# Patient Record
Sex: MALE | Race: WHITE | NOT HISPANIC OR LATINO | Employment: OTHER | URBAN - METROPOLITAN AREA
[De-identification: names, ages, dates, MRNs, and addresses within clinical notes are randomized per-mention and may not be internally consistent; named-entity substitution may affect disease eponyms.]

---

## 2017-01-09 ENCOUNTER — OFFICE VISIT (OUTPATIENT)
Dept: OPHTHALMOLOGY | Facility: CLINIC | Age: 72
End: 2017-01-09
Payer: MEDICARE

## 2017-01-09 ENCOUNTER — TELEPHONE (OUTPATIENT)
Dept: OPHTHALMOLOGY | Facility: CLINIC | Age: 72
End: 2017-01-09

## 2017-01-09 DIAGNOSIS — H33.22 RETINAL DETACHMENT, LEFT: ICD-10-CM

## 2017-01-09 DIAGNOSIS — H35.3131 NONEXUDATIVE AGE-RELATED MACULAR DEGENERATION, BILATERAL, EARLY DRY STAGE: ICD-10-CM

## 2017-01-09 DIAGNOSIS — H43.811 POSTERIOR VITREOUS DETACHMENT OF RIGHT EYE: Primary | ICD-10-CM

## 2017-01-09 DIAGNOSIS — H35.372 EPIRETINAL MEMBRANE, LEFT EYE: ICD-10-CM

## 2017-01-09 PROCEDURE — 92226 PR SPECIAL EYE EXAM, SUBSEQUENT: CPT | Mod: 50,PBBFAC | Performed by: OPHTHALMOLOGY

## 2017-01-09 PROCEDURE — 99999 PR PBB SHADOW E&M-EST. PATIENT-LVL II: CPT | Mod: PBBFAC,,, | Performed by: OPHTHALMOLOGY

## 2017-01-09 PROCEDURE — 92014 COMPRE OPH EXAM EST PT 1/>: CPT | Mod: S$PBB,,, | Performed by: OPHTHALMOLOGY

## 2017-01-09 PROCEDURE — 99212 OFFICE O/P EST SF 10 MIN: CPT | Mod: PBBFAC,25 | Performed by: OPHTHALMOLOGY

## 2017-01-09 PROCEDURE — 92226 PR SPECIAL EYE EXAM, SUBSEQUENT: CPT | Mod: 50,S$PBB,, | Performed by: OPHTHALMOLOGY

## 2017-01-09 NOTE — TELEPHONE ENCOUNTER
----- Message from Yojana Perea sent at 1/9/2017  9:15 AM CST -----  Contact: Patient  Original message sent to Brianna, but no reply so copying it to staff's in basket.  -----  Italia Reed. I have Mr. Sanjeev Brink on the line. He thinks he has detached retina in right eye. Has spots and flashes, sounds like this may not be first time for him. Said this happened over the weekend and he needs to be seen today. He said this happened Saturday evening. Describes mist over eye. Please call him about this at [phone] (MRN is [id number])

## 2017-01-09 NOTE — PROGRESS NOTES
OCT  Drusen OU, ERM OS      A/P    1. PVD OD  - no tears/Breaks  - RD precautions      2.  H/X RRD OS S/P PPV/SB/EL 1987  Corneal laceration & cataract removal & 2 implant 1 yr later.  23yrs ago.   OS  - stable monitor      3 Dry early ARMD OU  - monitor    4. ERM OS  - monitor      5. PCIOL OU  Mild PCO OD      1 month Dilate OD

## 2017-01-09 NOTE — MR AVS SNAPSHOT
[facility] - Ophthalmology  [address]  [facility] [address]  Phone: [phone]  Fax: [phone]                  Sanjeev Brink   2017 10:30 AM   Office Visit    Description:  Male : 1945   Provider:  BONG Moreau MD   Department:  [facility]           Reason for Visit     Spots and/or Floaters           Diagnoses this Visit        Comments    Posterior vitreous detachment of right eye    -  Primary     Nonexudative age-related macular degeneration, bilateral, early dry stage         Retinal detachment, left         Epiretinal membrane, left eye                To Do List           Goals (5 Years of Data)     None      Follow-Up and Disposition     Return in about 4 weeks (around 2017).      [facility] On Call     [facility] On Call Nurse Care Line -  Assistance  Registered nurses in the [facility] On Call Center provide clinical advisement, health education, appointment booking, and other advisory services.  Call for this free service at [phone].             Medications           Message regarding Medications     Verify the changes and/or additions to your medication regime listed below are the same as discussed with your clinician today.  If any of these changes or additions are incorrect, please notify your healthcare provider.             Verify that the below list of medications is an accurate representation of the medications you are currently taking.  If none reported, the list may be blank. If incorrect, please contact your healthcare provider. Carry this list with you in case of emergency.           Current Medications     allopurinol (ZYLOPRIM) 300 MG tablet Take 1 tablet (300 mg total) by mouth once daily.    amlodipine-benazepril 10-20mg (LOTREL) 10-20 mg per capsule Take 1 capsule by mouth once daily.    aspirin 81 MG Chew Take 81 mg by mouth once daily.    atorvastatin (LIPITOR) 20 MG tablet Take 1 tablet (20 mg total) by mouth once daily. For  chlesterol    cholecalciferol, vitamin D3, (VITAMIN D3) 2,000 unit Tab Take 1 tablet by mouth once daily.    cinnamon bark 500 mg capsule Take 2,000 mg by mouth once daily.    levoFLOXacin (LEVAQUIN) 500 MG tablet     meloxicam (MOBIC) 7.5 MG tablet     potassium citrate (UROCIT-K) 10 mEq (1,080 mg) TbSR TAKE 1 TABLET FOUR TIMES A DAY    vitamin E 200 UNIT capsule Take 200 Units by mouth once daily.           Clinical Reference Information           Allergies as of 1/9/2017     No Known Allergies      Immunizations Administered on Date of Encounter - 1/9/2017     None

## 2017-02-14 ENCOUNTER — OFFICE VISIT (OUTPATIENT)
Dept: OPHTHALMOLOGY | Facility: CLINIC | Age: 72
End: 2017-02-14
Payer: MEDICARE

## 2017-02-14 DIAGNOSIS — H35.372 EPIRETINAL MEMBRANE, LEFT EYE: ICD-10-CM

## 2017-02-14 DIAGNOSIS — H43.811 POSTERIOR VITREOUS DETACHMENT OF RIGHT EYE: ICD-10-CM

## 2017-02-14 DIAGNOSIS — H35.3131 NONEXUDATIVE AGE-RELATED MACULAR DEGENERATION, BILATERAL, EARLY DRY STAGE: Primary | ICD-10-CM

## 2017-02-14 PROCEDURE — 92226 PR SPECIAL EYE EXAM, SUBSEQUENT: CPT | Mod: PBBFAC,RT | Performed by: OPHTHALMOLOGY

## 2017-02-14 PROCEDURE — 92014 COMPRE OPH EXAM EST PT 1/>: CPT | Mod: S$PBB,,, | Performed by: OPHTHALMOLOGY

## 2017-02-14 PROCEDURE — 99212 OFFICE O/P EST SF 10 MIN: CPT | Mod: PBBFAC | Performed by: OPHTHALMOLOGY

## 2017-02-14 PROCEDURE — 92226 PR SPECIAL EYE EXAM, SUBSEQUENT: CPT | Mod: S$PBB,RT,, | Performed by: OPHTHALMOLOGY

## 2017-02-14 PROCEDURE — 99999 PR PBB SHADOW E&M-EST. PATIENT-LVL II: CPT | Mod: PBBFAC,,, | Performed by: OPHTHALMOLOGY

## 2017-02-14 NOTE — MR AVS SNAPSHOT
[facility] - Ophthalmology  [address]  [facility] [address]  Phone: [phone]  Fax: [phone]                  Sanjeev Brink   2017 9:20 AM   Office Visit    Description:  Male : 1945   Provider:  BONG Moreau MD   Department:  [facility] - Ophthalmology           Reason for Visit     1 mon ck           Diagnoses this Visit        Comments    Nonexudative age-related macular degeneration, bilateral, early dry stage    -  Primary     Posterior vitreous detachment of right eye         Epiretinal membrane, left eye                To Do List           Goals (5 Years of Data)     None      Follow-Up and Disposition     Return in about 3 months (around 2017).      [facility] On Call     [facility] On Call Nurse Care Line -  Assistance  Registered nurses in the [facility] On Call Center provide clinical advisement, health education, appointment booking, and other advisory services.  Call for this free service at [phone].             Medications           Message regarding Medications     Verify the changes and/or additions to your medication regime listed below are the same as discussed with your clinician today.  If any of these changes or additions are incorrect, please notify your healthcare provider.             Verify that the below list of medications is an accurate representation of the medications you are currently taking.  If none reported, the list may be blank. If incorrect, please contact your healthcare provider. Carry this list with you in case of emergency.           Current Medications     allopurinol (ZYLOPRIM) 300 MG tablet Take 1 tablet (300 mg total) by mouth once daily.    amlodipine-benazepril 10-20mg (LOTREL) 10-20 mg per capsule Take 1 capsule by mouth once daily.    aspirin 81 MG Chew Take 81 mg by mouth once daily.    atorvastatin (LIPITOR) 20 MG tablet Take 1 tablet (20 mg total) by mouth once daily. For chlesterol    cholecalciferol, vitamin D3,  (VITAMIN D3) 2,000 unit Tab Take 1 tablet by mouth once daily.    cinnamon bark 500 mg capsule Take 2,000 mg by mouth once daily.    levoFLOXacin (LEVAQUIN) 500 MG tablet     meloxicam (MOBIC) 7.5 MG tablet     potassium citrate (UROCIT-K) 10 mEq (1,080 mg) TbSR Take 1 tablet (10 mEq total) by mouth 4 (four) times daily.    vitamin E 200 UNIT capsule Take 200 Units by mouth once daily.           Clinical Reference Information           Allergies as of 2/14/2017     No Known Allergies      Immunizations Administered on Date of Encounter - 2/14/2017     None      Orders Placed During Today's Visit     Future Labs/Procedures Expected by Expires    Posterior Segment OCT Retina-Both eyes  As directed 2/14/2018      Language Assistance Services     ATTENTION: Language assistance services are available, free of charge. Please call [phone].      ATENCIÓN: Si shilpa thorpe, tiene a coto disposición servicios gratuitos de asistencia lingüística. Llame al [phone].     CHÚ Ý: N?u b?n nói Ti?ng Vi?t, có các d?ch v? h? tr? ngôn ng? mi?n phí dành cho b?n. G?i s? [phone].         [facility] complies with applicable Federal civil rights laws and does not discriminate on the basis of race, color, national origin, age, disability, or sex.

## 2017-02-14 NOTE — PROGRESS NOTES
OCT  Drusen OU, ERM OS      A/P    1. PVD OD  - no tears/Breaks  - RD precautions    Central Vit opacities OD - No PPV now, if NI, can consider      2.  H/X RRD OS S/P PPV/SB/EL 1987  Corneal laceration & cataract removal & 2 implant 1 yr later.  23yrs ago.   OS  - stable monitor      3 Dry early ARMD OU  - monitor    4. ERM OS  - monitor      5. PCIOL OU  PCO OD - see Pulin for possible Yag      3 months OCT

## 2017-03-24 ENCOUNTER — OFFICE VISIT (OUTPATIENT)
Dept: OPHTHALMOLOGY | Facility: CLINIC | Age: 72
End: 2017-03-24
Payer: MEDICARE

## 2017-03-24 DIAGNOSIS — H35.3131 NONEXUDATIVE AGE-RELATED MACULAR DEGENERATION, BILATERAL, EARLY DRY STAGE: Primary | ICD-10-CM

## 2017-03-24 DIAGNOSIS — H26.493 POSTERIOR CAPSULAR OPACIFICATION, BILATERAL: ICD-10-CM

## 2017-03-24 PROCEDURE — 99212 OFFICE O/P EST SF 10 MIN: CPT | Mod: PBBFAC | Performed by: OPHTHALMOLOGY

## 2017-03-24 PROCEDURE — 66821 AFTER CATARACT LASER SURGERY: CPT | Mod: S$PBB,RT,, | Performed by: OPHTHALMOLOGY

## 2017-03-24 PROCEDURE — 99999 PR PBB SHADOW E&M-EST. PATIENT-LVL II: CPT | Mod: PBBFAC,,, | Performed by: OPHTHALMOLOGY

## 2017-03-24 PROCEDURE — 92012 INTRM OPH EXAM EST PATIENT: CPT | Mod: 57,S$PBB,, | Performed by: OPHTHALMOLOGY

## 2017-03-24 PROCEDURE — 66821 AFTER CATARACT LASER SURGERY: CPT | Mod: PBBFAC,RT | Performed by: OPHTHALMOLOGY

## 2017-03-24 NOTE — MR AVS SNAPSHOT
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 macular degeneration, bilateral, early dry stage    -  Primary     Posterior capsular opacification, bilateral                To Do List           Future Appointments        Provider Department Dept Phone    2017 8:30 AM BONG Moreau MD [facility] Ophthalmology [phone]      Goals (5 Years of Data)     None      [facility] On Call     [facility] On Call Nurse Care Line -  Assistance  Registered nurses in the [facility] On Call Center provide clinical advisement, health education, appointment booking, and other advisory services.  Call for this free service at [phone].             Medications           Message regarding Medications     Verify the changes and/or additions to your medication regime listed below are the same as discussed with your clinician today.  If any of these changes or additions are incorrect, please notify your healthcare provider.             Verify that the below list of medications is an accurate representation of the medications you are currently taking.  If none reported, the list may be blank. If incorrect, please contact your healthcare provider. Carry this list with you in case of emergency.           Current Medications     allopurinol (ZYLOPRIM) 300 MG tablet Take 1 tablet (300 mg total) by mouth once daily.    amlodipine-benazepril 10-20mg (LOTREL) 10-20 mg per capsule Take 1 capsule by mouth once daily.    aspirin 81 MG Chew Take 81 mg by mouth once daily.    atorvastatin (LIPITOR) 20 MG tablet Take 1 tablet (20 mg total) by mouth once daily. For chlesterol     cholecalciferol, vitamin D3, (VITAMIN D3) 2,000 unit Tab Take 1 tablet by mouth once daily.    cinnamon bark 500 mg capsule Take 2,000 mg by mouth once daily.    levoFLOXacin (LEVAQUIN) 500 MG tablet     meloxicam (MOBIC) 7.5 MG tablet     potassium citrate (UROCIT-K) 10 mEq (1,080 mg) TbSR Take 1 tablet (10 mEq total) by mouth 4 (four) times daily.    vitamin E 200 UNIT capsule Take 200 Units by mouth once daily.           Clinical Reference Information           Allergies as of 3/24/2017     No Known Allergies      Immunizations Administered on Date of Encounter - 3/24/2017     None      Language Assistance Services     ATTENTION: Language assistance services are available, free of charge. Please call [phone].      ATENCIÓN: Si estivenla ila, tiene a coto disposición servicios gratuitos de asistencia lingüística. Llame al [phone].     KAT Ý: N?u b?n nói Ti?ng Vi?t, có các d?ch v? h? tr? ngôn ng? mi?n phí dành cho b?n. G?i s? [phone].         [facility] complies with applicable Federal civil rights laws and does not discriminate on the basis of race, color, national origin, age, disability, or sex.

## 2017-05-19 ENCOUNTER — OFFICE VISIT (OUTPATIENT)
Dept: OPHTHALMOLOGY | Facility: CLINIC | Age: 72
End: 2017-05-19
Payer: MEDICARE

## 2017-05-19 DIAGNOSIS — H33.22 RETINAL DETACHMENT, LEFT: ICD-10-CM

## 2017-05-19 DIAGNOSIS — H35.3131 NONEXUDATIVE AGE-RELATED MACULAR DEGENERATION, BILATERAL, EARLY DRY STAGE: ICD-10-CM

## 2017-05-19 DIAGNOSIS — H43.811 POSTERIOR VITREOUS DETACHMENT OF RIGHT EYE: ICD-10-CM

## 2017-05-19 DIAGNOSIS — H35.372 EPIRETINAL MEMBRANE, LEFT EYE: Primary | ICD-10-CM

## 2017-05-19 PROCEDURE — 92134 CPTRZ OPH DX IMG PST SGM RTA: CPT | Mod: PBBFAC | Performed by: OPHTHALMOLOGY

## 2017-05-19 PROCEDURE — 99213 OFFICE O/P EST LOW 20 MIN: CPT | Mod: PBBFAC | Performed by: OPHTHALMOLOGY

## 2017-05-19 PROCEDURE — 99999 PR PBB SHADOW E&M-EST. PATIENT-LVL III: CPT | Mod: PBBFAC,,, | Performed by: OPHTHALMOLOGY

## 2017-05-19 PROCEDURE — 92014 COMPRE OPH EXAM EST PT 1/>: CPT | Mod: 24,S$PBB,, | Performed by: OPHTHALMOLOGY

## 2017-05-19 PROCEDURE — 92226 PR SPECIAL EYE EXAM, SUBSEQUENT: CPT | Mod: 50,S$PBB,, | Performed by: OPHTHALMOLOGY

## 2017-05-19 PROCEDURE — 92226 PR SPECIAL EYE EXAM, SUBSEQUENT: CPT | Mod: 50,PBBFAC | Performed by: OPHTHALMOLOGY

## 2017-05-19 NOTE — PROGRESS NOTES
OCT  Drusen OU, ERM OS      A/P    1. PVD OD  - no tears/Breaks  - RD precautions    Central Vit opacities OD - No PPV now, if NI, can consider      2.  H/X RRD OS S/P PPV/SB/EL 1987  Corneal laceration & cataract removal & 2 implant 1 yr later.  23yrs ago.   OS  - stable monitor      3 Dry early ARMD OU  - monitor    4. ERM OS  - monitor      5. PCIOL OU  PCO OD - see Pulin for possible Yag      12 months OCT

## 2017-07-05 ENCOUNTER — LAB VISIT (OUTPATIENT)
Dept: LAB | Facility: OTHER | Age: 72
End: 2017-07-05
Attending: INTERNAL MEDICINE
Payer: MEDICARE

## 2017-07-05 DIAGNOSIS — R89.9 ABNORMAL LABORATORY TEST RESULT: ICD-10-CM

## 2017-07-05 DIAGNOSIS — R79.9 ABNORMAL FINDING OF BLOOD CHEMISTRY: ICD-10-CM

## 2017-07-05 LAB
ESTIMATED AVG GLUCOSE: 143 MG/DL
HBA1C MFR BLD HPLC: 6.6 %

## 2017-07-05 PROCEDURE — 36415 COLL VENOUS BLD VENIPUNCTURE: CPT

## 2017-07-05 PROCEDURE — 83036 HEMOGLOBIN GLYCOSYLATED A1C: CPT

## 2018-05-22 ENCOUNTER — OFFICE VISIT (OUTPATIENT)
Dept: OPHTHALMOLOGY | Facility: CLINIC | Age: 73
End: 2018-05-22
Payer: MEDICARE

## 2018-05-22 DIAGNOSIS — H33.22 RETINAL DETACHMENT, LEFT: Primary | ICD-10-CM

## 2018-05-22 DIAGNOSIS — H43.811 POSTERIOR VITREOUS DETACHMENT OF RIGHT EYE: ICD-10-CM

## 2018-05-22 DIAGNOSIS — H35.372 EPIRETINAL MEMBRANE, LEFT EYE: ICD-10-CM

## 2018-05-22 DIAGNOSIS — H35.3131 NONEXUDATIVE AGE-RELATED MACULAR DEGENERATION, BILATERAL, EARLY DRY STAGE: ICD-10-CM

## 2018-05-22 PROCEDURE — 99999 PR PBB SHADOW E&M-EST. PATIENT-LVL III: CPT | Mod: PBBFAC,,, | Performed by: OPHTHALMOLOGY

## 2018-05-22 PROCEDURE — 92134 CPTRZ OPH DX IMG PST SGM RTA: CPT | Mod: PBBFAC | Performed by: OPHTHALMOLOGY

## 2018-05-22 PROCEDURE — 92226 PR SPECIAL EYE EXAM, SUBSEQUENT: CPT | Mod: S$PBB,50,, | Performed by: OPHTHALMOLOGY

## 2018-05-22 PROCEDURE — 99213 OFFICE O/P EST LOW 20 MIN: CPT | Mod: PBBFAC | Performed by: OPHTHALMOLOGY

## 2018-05-22 PROCEDURE — 92226 PR SPECIAL EYE EXAM, SUBSEQUENT: CPT | Mod: 50,PBBFAC | Performed by: OPHTHALMOLOGY

## 2018-05-22 PROCEDURE — 92014 COMPRE OPH EXAM EST PT 1/>: CPT | Mod: S$PBB,,, | Performed by: OPHTHALMOLOGY

## 2018-05-22 RX ORDER — LANOLIN ALCOHOL/MO/W.PET/CERES
400 CREAM (GRAM) TOPICAL DAILY
COMMUNITY

## 2018-05-22 NOTE — PROGRESS NOTES
HPI     Yearly / OCT   DLS- 05/19/2017    Slight change in va but mainly due to needing new glasses rx.   Denies pain   (-)Flashes (-)Floaters  (+)Photophobia  (-)Glare      OCT  Drusen OU, ERM OS      A/P    1. PVD OD  - no tears/Breaks  - RD precautions    Central Vit opacities OD - No PPV now, if NI, can consider      2.  H/X RRD OS S/P PPV/SB/EL 1987  Corneal laceration & cataract removal & 2 implant 1 yr later.    OS  - stable monitor      3 Dry early ARMD OU  - monitor    4. ERM OS  - monitor      5. PCIOL OU  PCO OD - see Pulin for possible Yag      12 months OCT

## 2018-06-19 ENCOUNTER — OFFICE VISIT (OUTPATIENT)
Dept: OPTOMETRY | Facility: CLINIC | Age: 73
End: 2018-06-19
Payer: MEDICARE

## 2018-06-19 DIAGNOSIS — H52.13 MYOPIA WITH ASTIGMATISM AND PRESBYOPIA, BILATERAL: Primary | ICD-10-CM

## 2018-06-19 DIAGNOSIS — H52.203 MYOPIA WITH ASTIGMATISM AND PRESBYOPIA, BILATERAL: Primary | ICD-10-CM

## 2018-06-19 DIAGNOSIS — H52.4 MYOPIA WITH ASTIGMATISM AND PRESBYOPIA, BILATERAL: Primary | ICD-10-CM

## 2018-06-19 PROCEDURE — 99999 PR PBB SHADOW E&M-EST. PATIENT-LVL II: CPT | Mod: PBBFAC,,, | Performed by: OPTOMETRIST

## 2018-06-19 PROCEDURE — 99212 OFFICE O/P EST SF 10 MIN: CPT | Mod: PBBFAC | Performed by: OPTOMETRIST

## 2018-06-19 PROCEDURE — 92015 DETERMINE REFRACTIVE STATE: CPT | Mod: ,,, | Performed by: OPTOMETRIST

## 2018-06-19 NOTE — PROGRESS NOTES
HPI     Last eye exam was 5/22/18 with Dr. Moreau.  Patient states unhappy with most recent pair of glasses he got after last   exam-doesn't feel the vision changed but can't keep glasses cleaned.   Occasionally sees floater OU. Recently dilated by Dr. Moreau.   Patient denies diplopia, headaches, flashes, and pain.      Last edited by Danisha Segovia on 6/19/2018  8:53 AM. (History)            Assessment /Plan     For exam results, see Encounter Report.    Myopia with astigmatism and presbyopia, bilateral            1.  Bifocal rx given.  Continue follow-up with Dr. Moreau.

## 2018-07-11 ENCOUNTER — HOSPITAL ENCOUNTER (OUTPATIENT)
Dept: RADIOLOGY | Facility: OTHER | Age: 73
Discharge: HOME | End: 2018-07-11
Attending: INTERNAL MEDICINE
Payer: MEDICARE

## 2018-07-11 DIAGNOSIS — R10.11 RUQ PAIN: ICD-10-CM

## 2018-07-12 ENCOUNTER — HOSPITAL ENCOUNTER (OUTPATIENT)
Dept: RADIOLOGY | Facility: OTHER | Age: 73
Discharge: HOME | End: 2018-07-12
Attending: INTERNAL MEDICINE
Payer: MEDICARE

## 2018-07-12 PROCEDURE — 76700 US EXAM ABDOM COMPLETE: CPT | Mod: 26,,, | Performed by: RADIOLOGY

## 2018-07-12 PROCEDURE — 76700 US EXAM ABDOM COMPLETE: CPT | Mod: TC

## 2018-08-08 ENCOUNTER — HOSPITAL ENCOUNTER (OUTPATIENT)
Dept: RADIOLOGY | Facility: OTHER | Age: 73
Discharge: HOME | End: 2018-08-08
Attending: INTERNAL MEDICINE
Payer: MEDICARE

## 2018-08-08 DIAGNOSIS — R10.11 RUQ PAIN: ICD-10-CM

## 2018-08-08 PROCEDURE — 76700 US EXAM ABDOM COMPLETE: CPT | Mod: TC

## 2018-08-08 PROCEDURE — 76700 US EXAM ABDOM COMPLETE: CPT | Mod: 26,,, | Performed by: RADIOLOGY

## 2018-08-27 DIAGNOSIS — I77.810 ASCENDING AORTA DILATATION: Primary | ICD-10-CM

## 2018-09-28 DIAGNOSIS — I71.20 ANEURYSM, AORTA, THORACIC: Primary | ICD-10-CM

## 2019-03-15 DIAGNOSIS — I71.9 AORTIC ANEURYSM: Primary | ICD-10-CM

## 2019-05-28 ENCOUNTER — LAB VISIT (OUTPATIENT)
Dept: LAB | Facility: OTHER | Age: 74
End: 2019-05-28
Attending: INTERNAL MEDICINE
Payer: MEDICARE

## 2019-05-28 DIAGNOSIS — Z12.5 SCREENING FOR PROSTATE CANCER: ICD-10-CM

## 2019-05-28 LAB — COMPLEXED PSA SERPL-MCNC: 0.71 NG/ML (ref 0–4)

## 2019-05-28 PROCEDURE — 36415 COLL VENOUS BLD VENIPUNCTURE: CPT

## 2019-05-28 PROCEDURE — 84153 ASSAY OF PSA TOTAL: CPT

## 2019-08-06 DIAGNOSIS — I71.20 ANEURYSM, AORTA, THORACIC: Primary | ICD-10-CM

## 2019-09-12 ENCOUNTER — LAB VISIT (OUTPATIENT)
Dept: LAB | Facility: OTHER | Age: 74
End: 2019-09-12
Attending: INTERNAL MEDICINE
Payer: MEDICARE

## 2019-09-12 DIAGNOSIS — R73.9 ELEVATED BLOOD SUGAR: ICD-10-CM

## 2019-09-12 LAB
ESTIMATED AVG GLUCOSE: 140 MG/DL (ref 68–131)
HBA1C MFR BLD HPLC: 6.5 % (ref 4–5.6)

## 2019-09-12 PROCEDURE — 36415 COLL VENOUS BLD VENIPUNCTURE: CPT

## 2019-09-12 PROCEDURE — 83036 HEMOGLOBIN GLYCOSYLATED A1C: CPT

## 2020-02-10 ENCOUNTER — OFFICE VISIT (OUTPATIENT)
Dept: OPTOMETRY | Facility: CLINIC | Age: 75
End: 2020-02-10
Payer: MEDICARE

## 2020-02-10 DIAGNOSIS — H52.4 MYOPIA WITH ASTIGMATISM AND PRESBYOPIA, BILATERAL: Primary | ICD-10-CM

## 2020-02-10 DIAGNOSIS — H52.13 MYOPIA WITH ASTIGMATISM AND PRESBYOPIA, BILATERAL: Primary | ICD-10-CM

## 2020-02-10 DIAGNOSIS — H52.203 MYOPIA WITH ASTIGMATISM AND PRESBYOPIA, BILATERAL: Primary | ICD-10-CM

## 2020-02-10 PROCEDURE — 99999 PR PBB SHADOW E&M-EST. PATIENT-LVL II: ICD-10-PCS | Mod: PBBFAC,,, | Performed by: OPTOMETRIST

## 2020-02-10 PROCEDURE — 99212 OFFICE O/P EST SF 10 MIN: CPT | Mod: PBBFAC | Performed by: OPTOMETRIST

## 2020-02-10 PROCEDURE — 92015 PR REFRACTION: ICD-10-PCS | Mod: ,,, | Performed by: OPTOMETRIST

## 2020-02-10 PROCEDURE — 92015 DETERMINE REFRACTIVE STATE: CPT | Mod: ,,, | Performed by: OPTOMETRIST

## 2020-02-10 PROCEDURE — 99999 PR PBB SHADOW E&M-EST. PATIENT-LVL II: CPT | Mod: PBBFAC,,, | Performed by: OPTOMETRIST

## 2020-02-10 NOTE — PROGRESS NOTES
HPI     Last eye exam was 6/19/18 with Dr. Champagne.  Patient states didn't update glasses rx after last exam. Has noticed   vision is slightly worse since last visit. Patient scheduled to see Dr. Moreau tomorrow for dilated exam.   Patient denies diplopia, headaches, flashes/floaters, and pain.    Hemoglobin A1C       Date                     Value               Ref Range             Status                09/12/2019               6.5 (H)             4.0 - 5.6 %           Final                  Last edited by Danisha Segovia on 2/10/2020  8:02 AM. (History)            Assessment /Plan     For exam results, see Encounter Report.    Myopia with astigmatism and presbyopia, bilateral            1.  Bifocal rx given.  RTC as scheduled with Dr. Moreau.

## 2020-02-11 ENCOUNTER — OFFICE VISIT (OUTPATIENT)
Dept: OPHTHALMOLOGY | Facility: CLINIC | Age: 75
End: 2020-02-11
Payer: MEDICARE

## 2020-02-11 DIAGNOSIS — H43.811 POSTERIOR VITREOUS DETACHMENT OF RIGHT EYE: ICD-10-CM

## 2020-02-11 DIAGNOSIS — H35.723 RETINAL PIGMENT EPITHELIAL DETACHMENT OF BOTH EYES: ICD-10-CM

## 2020-02-11 DIAGNOSIS — H35.372 EPIRETINAL MEMBRANE, LEFT EYE: ICD-10-CM

## 2020-02-11 DIAGNOSIS — H35.3131 NONEXUDATIVE AGE-RELATED MACULAR DEGENERATION, BILATERAL, EARLY DRY STAGE: Primary | ICD-10-CM

## 2020-02-11 PROCEDURE — 92134 CPTRZ OPH DX IMG PST SGM RTA: CPT | Mod: PBBFAC | Performed by: OPHTHALMOLOGY

## 2020-02-11 PROCEDURE — 99999 PR PBB SHADOW E&M-EST. PATIENT-LVL III: CPT | Mod: PBBFAC,,, | Performed by: OPHTHALMOLOGY

## 2020-02-11 PROCEDURE — 92201 OPSCPY EXTND RTA DRAW UNI/BI: CPT | Mod: S$PBB,,, | Performed by: OPHTHALMOLOGY

## 2020-02-11 PROCEDURE — 92201 PR OPHTHALMOSCOPY, EXT, W/RET DRAW/SCLERAL DEPR, I&R, UNI/BI: ICD-10-PCS | Mod: S$PBB,,, | Performed by: OPHTHALMOLOGY

## 2020-02-11 PROCEDURE — 92014 PR EYE EXAM, EST PATIENT,COMPREHESV: ICD-10-PCS | Mod: S$PBB,,, | Performed by: OPHTHALMOLOGY

## 2020-02-11 PROCEDURE — 99999 PR PBB SHADOW E&M-EST. PATIENT-LVL III: ICD-10-PCS | Mod: PBBFAC,,, | Performed by: OPHTHALMOLOGY

## 2020-02-11 PROCEDURE — 92134 POSTERIOR SEGMENT OCT RETINA (OCULAR COHERENCE TOMOGRAPHY)-BOTH EYES: ICD-10-PCS | Mod: 26,S$PBB,, | Performed by: OPHTHALMOLOGY

## 2020-02-11 PROCEDURE — 92014 COMPRE OPH EXAM EST PT 1/>: CPT | Mod: S$PBB,,, | Performed by: OPHTHALMOLOGY

## 2020-02-11 PROCEDURE — 99213 OFFICE O/P EST LOW 20 MIN: CPT | Mod: PBBFAC,25 | Performed by: OPHTHALMOLOGY

## 2020-02-11 NOTE — PROGRESS NOTES
HPI     Overdue Yearly / OCT   DLS- 05/22/2018 Dr. Moreau    Pt sts just updated new gls rx and was minor change.  No problems or concerns     Denies pain   (-)Flashes (-)Floaters  (+)Photophobia  (-)Glare      No gtts         OCT  Drusen OU, ERM OS      A/P    1. PVD OD  - no tears/Breaks  - RD precautions    Central Vit opacities OD - No PPV now, if NI, can consider      2.  H/X RRD OS S/P PPV/SB/EL 1987  Corneal laceration & cataract removal & 2 implant 1 yr later.    OS  - stable monitor      3 Dry early ARMD OU  - monitor    4. ERM OS  - monitor  20/30 yesterday with Dr. palencia      5. PCIOL OU        2 year OCT

## 2020-02-12 PROBLEM — R07.9 CHEST PAIN: Status: ACTIVE | Noted: 2020-02-12

## 2020-02-27 PROBLEM — I71.21 ASCENDING AORTIC ANEURYSM: Status: ACTIVE | Noted: 2020-02-27

## 2020-02-27 PROBLEM — I25.10 CORONARY ARTERY DISEASE INVOLVING NATIVE CORONARY ARTERY: Status: ACTIVE | Noted: 2020-02-27

## 2020-05-26 ENCOUNTER — LAB VISIT (OUTPATIENT)
Dept: LAB | Facility: OTHER | Age: 75
End: 2020-05-26
Attending: INTERNAL MEDICINE
Payer: MEDICARE

## 2020-05-26 DIAGNOSIS — E11.9 DIABETES MELLITUS WITHOUT COMPLICATION: ICD-10-CM

## 2020-05-26 LAB
ESTIMATED AVG GLUCOSE: 157 MG/DL (ref 68–131)
HBA1C MFR BLD HPLC: 7.1 % (ref 4–5.6)

## 2020-05-26 PROCEDURE — 83036 HEMOGLOBIN GLYCOSYLATED A1C: CPT

## 2020-05-26 PROCEDURE — 36415 COLL VENOUS BLD VENIPUNCTURE: CPT

## 2020-07-24 ENCOUNTER — LAB VISIT (OUTPATIENT)
Dept: INTERNAL MEDICINE | Facility: CLINIC | Age: 75
End: 2020-07-24
Payer: MEDICARE

## 2020-07-24 DIAGNOSIS — Z20.828 EXPOSURE TO SARS-ASSOCIATED CORONAVIRUS: ICD-10-CM

## 2020-07-24 PROCEDURE — U0003 INFECTIOUS AGENT DETECTION BY NUCLEIC ACID (DNA OR RNA); SEVERE ACUTE RESPIRATORY SYNDROME CORONAVIRUS 2 (SARS-COV-2) (CORONAVIRUS DISEASE [COVID-19]), AMPLIFIED PROBE TECHNIQUE, MAKING USE OF HIGH THROUGHPUT TECHNOLOGIES AS DESCRIBED BY CMS-2020-01-R: HCPCS

## 2020-07-25 LAB — SARS-COV-2 RNA RESP QL NAA+PROBE: NOT DETECTED

## 2020-09-16 ENCOUNTER — OFFICE VISIT (OUTPATIENT)
Dept: FAMILY MEDICINE | Facility: CLINIC | Age: 75
End: 2020-09-16
Payer: MEDICARE

## 2020-09-16 VITALS
HEART RATE: 60 BPM | HEIGHT: 69 IN | SYSTOLIC BLOOD PRESSURE: 134 MMHG | TEMPERATURE: 98 F | WEIGHT: 177.88 LBS | BODY MASS INDEX: 26.34 KG/M2 | OXYGEN SATURATION: 99 % | DIASTOLIC BLOOD PRESSURE: 70 MMHG | RESPIRATION RATE: 18 BRPM

## 2020-09-16 DIAGNOSIS — H35.3131 NONEXUDATIVE AGE-RELATED MACULAR DEGENERATION, BILATERAL, EARLY DRY STAGE: ICD-10-CM

## 2020-09-16 DIAGNOSIS — I70.0 AORTIC CALCIFICATION: ICD-10-CM

## 2020-09-16 DIAGNOSIS — Z00.00 ENCOUNTER FOR PREVENTIVE HEALTH EXAMINATION: Primary | ICD-10-CM

## 2020-09-16 DIAGNOSIS — I10 ESSENTIAL HYPERTENSION: ICD-10-CM

## 2020-09-16 DIAGNOSIS — I70.213 ATHEROSCLEROSIS OF NATIVE ARTERY OF BOTH LOWER EXTREMITIES WITH INTERMITTENT CLAUDICATION: ICD-10-CM

## 2020-09-16 DIAGNOSIS — I71.21 ASCENDING AORTIC ANEURYSM: ICD-10-CM

## 2020-09-16 DIAGNOSIS — E66.3 OVERWEIGHT (BMI 25.0-29.9): ICD-10-CM

## 2020-09-16 DIAGNOSIS — M10.9 GOUT, ARTHRITIS: ICD-10-CM

## 2020-09-16 DIAGNOSIS — E11.65 TYPE 2 DIABETES MELLITUS WITH HYPERGLYCEMIA, WITHOUT LONG-TERM CURRENT USE OF INSULIN: ICD-10-CM

## 2020-09-16 DIAGNOSIS — I25.10 CORONARY ARTERY DISEASE INVOLVING NATIVE CORONARY ARTERY OF NATIVE HEART WITHOUT ANGINA PECTORIS: ICD-10-CM

## 2020-09-16 PROCEDURE — G0439 PR MEDICARE ANNUAL WELLNESS SUBSEQUENT VISIT: ICD-10-PCS | Mod: S$GLB,,, | Performed by: NURSE PRACTITIONER

## 2020-09-16 PROCEDURE — G0439 PPPS, SUBSEQ VISIT: HCPCS | Mod: S$GLB,,, | Performed by: NURSE PRACTITIONER

## 2020-09-16 PROCEDURE — 99999 PR PBB SHADOW E&M-EST. PATIENT-LVL IV: ICD-10-PCS | Mod: PBBFAC,,, | Performed by: NURSE PRACTITIONER

## 2020-09-16 PROCEDURE — 99999 PR PBB SHADOW E&M-EST. PATIENT-LVL IV: CPT | Mod: PBBFAC,,, | Performed by: NURSE PRACTITIONER

## 2020-09-16 PROCEDURE — 99214 OFFICE O/P EST MOD 30 MIN: CPT | Mod: PBBFAC,PN | Performed by: NURSE PRACTITIONER

## 2020-09-16 NOTE — PROGRESS NOTES
"  Sanjeev Brink presented for a  Medicare AWV and comprehensive Health Risk Assessment today. The following components were reviewed and updated:    · Medical history  · Family History  · Social history  · Allergies and Current Medications  · Health Risk Assessment  · Health Maintenance  · Care Team     ** See Completed Assessments for Annual Wellness Visit within the encounter summary.**         The following assessments were completed:  · Living Situation  · CAGE  · Depression Screening  · Timed Get Up and Go  · Whisper Test  · Cognitive Function Screening  · Nutrition Screening  · ADL Screening  · PAQ Screening            Vitals:    09/16/20 1136   BP: 134/70   Pulse: 60   Resp: 18   Temp: 97.8 °F (36.6 °C)   SpO2: 99%   Weight: 80.7 kg (177 lb 14.4 oz)   Height: 5' 9" (1.753 m)     Body mass index is 26.27 kg/m².  Physical Exam  Constitutional:       General: He is not in acute distress.     Appearance: Normal appearance. He is well-developed. He is not ill-appearing.   HENT:      Head: Normocephalic.      Right Ear: Tympanic membrane and ear canal normal.      Left Ear: Ear canal normal.      Nose: Nose normal.   Eyes:      Conjunctiva/sclera: Conjunctivae normal.      Pupils: Pupils are equal, round, and reactive to light.   Neck:      Musculoskeletal: Normal range of motion and neck supple.   Cardiovascular:      Rate and Rhythm: Normal rate and regular rhythm.   Pulmonary:      Effort: Pulmonary effort is normal.      Breath sounds: Normal breath sounds.   Abdominal:      General: Bowel sounds are normal.      Palpations: Abdomen is soft.      Tenderness: There is no abdominal tenderness.   Musculoskeletal: Normal range of motion.         General: No swelling.   Skin:     General: Skin is warm and dry.   Neurological:      Mental Status: He is alert and oriented to person, place, and time.   Psychiatric:         Mood and Affect: Mood normal.         Behavior: Behavior normal.             Diagnoses and health " risks identified today and associated recommendations/orders:    1. Encounter for preventive health examination    2. Essential hypertension  3. Coronary artery disease involving native coronary artery of native heart without angina pectoris  4. Atherosclerosis of native artery of both lower extremities with intermittent claudication  5. Aortic calcification  6. Ascending aortic aneurysm  - Chronic, stable, continue current medication therapy  - Followed by PCP and cardiology    7. Type 2 diabetes mellitus with hyperglycemia, without long-term current use of insulin  - Chronic, stable; controlled with diet and exercise  - Followed by PCP    8. Gout, arthritis  - Chronic, stable, continue current medication therapy  - Followed by PCP    9. Nonexudative age-related macular degeneration, bilateral, early dry stage  - Chronic, stable, continue current medication therapy  - Followed by opthalmology     10. Overweight (BMI 25.0-29.9)  - Recommend healthy diet and exercise      Provided Sanjeev with a 5-10 year written screening schedule and personal prevention plan. Recommendations were developed using the USPSTF age appropriate recommendations. Education, counseling, and referrals were provided as needed. After Visit Summary printed and given to patient which includes a list of additional screenings\tests needed.    Follow up for PCP visit as scheduled and Annual Medicare Wellness in 1 year.    SEBASTIAN Alexis  I offered to discuss end of life issues, including information on how to make advance directives that the patient could use to name someone who would make medical decisions on their behalf if they became too ill to make themselves.    _X_Patient declined  ___Patient is interested, I provided paper work and offered to discuss.

## 2020-09-16 NOTE — PATIENT INSTRUCTIONS
Healthy Meals for Diabetes     A healthcare provider will help you develop a meal plan that fits your needs.   Ask your healthcare team to help you make a meal plan that fits your needs. Your meal plan tells you when to eat your meals and snacks, what kinds of foods to eat, and how much of each food to eat. You dont have to give up all the foods you like. But you do need to follow some guidelines.  Choose healthy carbohydrates  Starches, sugars, and fiber are all types of carbohydrates. Fiber can help lower your cholesterol and triglycerides. Fiber is also healthy for your heart. You should have 20 to 35 grams of total fiber each day. Fiber-rich foods include:  · Whole-grain breads and cereals  · Bulgur wheat  · Brown rice     · Whole-wheat pasta  · Fruits and vegetables  · Dry beans, and peas   Keep track of the amount of carbohydrates you eat. This can help you keep the right balance of physical activity and medicine. The amount of carbohydrates needed will vary for each person. It depends on many things such as your health, the medicines you take, and how active you are. Your healthcare team will help you figure out the right amount of carbohydrates for you. You may start with around 45 to 60 grams of carbohydrates per meal, depending on your situation.   Here are some examples of foods containing about 15 grams of carbohydrates (1 serving of carbohydrates):  · 1/2 cup of canned or frozen fruit  · A small piece of fresh fruit (4 ounces)  · 1 slice of bread  · 1/2 cup of oatmeal  · 1/3 cup of rice  · 4 to 6 crackers  · 1/2 English muffin  · 1/2 cup of black beans  · 1/4 of a large baked potato (3 ounces)  · 2/3 cup of plain fat-free yogurt  · 1 cup of soup  · 1/2 cup of casserole  · 6 chicken nuggets  · 2-inch-square brownie or cake without frosting  · 2 small cookies  · 1/2 cup of ice cream or sherbet  Choose healthy protein foods  Eating protein that is low in fat can help you control your weight. It also  helps keep your heart healthy. Low-fat protein foods include:  · Fish  · Plant proteins, such as dry beans and peas, nuts, and soy products like tofu and soymilk  · Lean meat with all visible fat removed  · Poultry with the skin removed  · Low-fat or nonfat milk, cheese, and yogurt  Limit unhealthy fats and sugar  Saturated and trans fats are unhealthy for your heart. They raise LDL (bad) cholesterol. Fat is also high in calories, so it can make you gain weight. To cut down on unhealthy fats and sugar, limit these foods:  · Butter or margarine  · Palm and palm kernel oils and coconut oil  · Cream  · Cheese  · Griffith  · Lunch meats     · Ice cream  · Sweet bakery goods such as pies, muffins, and donuts  · Jams and jellies  · Candy bars  · Regular sodas   How much to eat  The amount of food you eat affects your blood sugar. It also affects your weight. Your healthcare team will tell you how much of each type of food you should eat.  · Use measuring cups and spoons and a food scale to measure serving sizes.  · Learn what a correct serving size looks like on your plate. This will help when you are away from home and cant measure your servings.  · Eat only the number of servings given on your meal plan for each food. Dont take seconds.  · Learn to read food labels. Be sure to look at serving size, total carbohydrates, fiber, calories, sugar, and saturated and trans fats. Look for healthier alternatives to foods that have added sugar.  · Plan ahead for parties so you can still have a good time without going overboard with unhealthy food choices. Set a good example yourself by bringing a healthy dish to pot lucks.   Choose healthy snacks  When it comes to snacks, we usually think about foods with added sugar and fats. But there are many other options for healthier snack choices. Here are a few snack ideas to choose from:  Snacks with less than 5 grams of carbohydrates  · 1 piece of string cheese  · 3 celery sticks plus 1  tablespoon of peanut butter  · 5 cherry tomatoes plus 1 tablespoon of ranch dressing  · 1 hard-boiled egg  · 1/4 cup of fresh blueberries  ·  5 baby carrots  · 1 cup of light popcorn  · 1/2 cup of sugar-free gelatin  · 15 almonds  Snacks with about 10 to 20 grams of carbohydrates  · 1/3 cup of hummus plus 1 cup of fresh cut nonstarchy vegetables (carrots, green peppers, broccoli, celery, or a combination)  · 1/2 cup of fresh or canned fruit plus 1/4 cup of cottage cheese  · 1/2 cup of tuna salad with 4 crackers  · 2 rice cakes and a tablespoon of peanut butter  · 1 small apple or orange  · 3 cups light popcorn  · 1/2 of a turkey sandwich (1 slice of whole-wheat bread, 2 ounces of turkey, and mustard)  Portion sizes are important to controlling your blood sugar and staying at a healthy weight. Stock up on healthy snack items so you always have them on hand.  When to eat  Your meal plan will likely include breakfast, lunch, dinner, and some snacks.  · Try to eat your meals and snacks at about the same times each day.  · Eat all your meals and snacks. Skipping a meal or snack can make your blood sugar drop too low. It can also cause you to eat too much at the next meal or snack. Then your blood sugar could get too high.  Date Last Reviewed: 7/1/2016  © 5939-0841 [employer]. [address] [place] [address], [place] [address]. All rights reserved. This information is not intended as a substitute for professional medical care. Always follow your healthcare professional's instructions.        Counseling and Referral of Other Preventative  (Italic type indicates deductible and co-insurance are waived)    Patient Name: Sanjeev Brink  Today's Date: 9/16/2020    Health Maintenance       Date Due Completion Date    Hepatitis C Screening 1945 ---    Foot Exam 09/22/1955 ---    TETANUS VACCINE 09/22/1963 ---    Shingles Vaccine (2 of 3) 12/10/2013 10/15/2013    Colorectal Cancer Screening 05/30/2018 5/30/2013    Lipid  Panel 03/14/2020 3/14/2019    Pneumococcal Vaccine (65+ Low/Medium Risk) (2 of 2 - PPSV23) 06/11/2020 6/11/2019    Influenza Vaccine (1) 08/01/2020 ---    Hemoglobin A1c 11/26/2020 5/26/2020    Eye Exam 02/11/2021 2/11/2020    High Dose Statin 09/16/2021 9/16/2020        No orders of the defined types were placed in this encounter.    The following information is provided to all patients.  This information is to help you find resources for any of the problems found today that may be affecting your health:                Living healthy guide: www.[facility].[place].[facility]      Understanding Diabetes: www.diabetes.org      Eating healthy: www.cdc.gov/healthyweight      CDC home safety checklist: www.cdc.gov/steadi/patient.html      Agency on Aging: www.goea.[place]      Alcoholics anonymous (AA): www.aa.org      Physical Activity: www.kamar.nih.gov/sv9cfeh      Tobacco use: www.quitwithusla.org

## 2021-01-19 ENCOUNTER — IMMUNIZATION (OUTPATIENT)
Dept: FAMILY MEDICINE | Facility: CLINIC | Age: 76
End: 2021-01-19
Payer: MEDICARE

## 2021-01-19 DIAGNOSIS — Z23 NEED FOR VACCINATION: Primary | ICD-10-CM

## 2021-01-19 PROCEDURE — 91300 COVID-19, MRNA, LNP-S, PF, 30 MCG/0.3 ML DOSE VACCINE: CPT | Mod: PBBFAC,PN

## 2021-02-09 ENCOUNTER — IMMUNIZATION (OUTPATIENT)
Dept: FAMILY MEDICINE | Facility: CLINIC | Age: 76
End: 2021-02-09
Payer: MEDICARE

## 2021-02-09 DIAGNOSIS — Z23 NEED FOR VACCINATION: Primary | ICD-10-CM

## 2021-02-09 PROCEDURE — 91300 COVID-19, MRNA, LNP-S, PF, 30 MCG/0.3 ML DOSE VACCINE: CPT | Mod: PBBFAC | Performed by: FAMILY MEDICINE

## 2021-02-09 PROCEDURE — 0002A COVID-19, MRNA, LNP-S, PF, 30 MCG/0.3 ML DOSE VACCINE: CPT | Mod: PBBFAC | Performed by: FAMILY MEDICINE

## 2021-03-26 PROBLEM — E11.65 TYPE 2 DIABETES MELLITUS WITH HYPERGLYCEMIA, WITHOUT LONG-TERM CURRENT USE OF INSULIN: Status: ACTIVE | Noted: 2021-03-26

## 2021-03-26 PROBLEM — D03.59 MELANOMA IN SITU OF OTHER PART OF TRUNK: Status: ACTIVE | Noted: 2021-03-26

## 2021-06-02 DIAGNOSIS — I71.40 ABDOMINAL AORTIC ANEURYSM, WITHOUT RUPTURE: ICD-10-CM

## 2021-07-27 ENCOUNTER — OFFICE VISIT (OUTPATIENT)
Dept: CARDIOTHORACIC SURGERY | Facility: CLINIC | Age: 76
End: 2021-07-27
Payer: MEDICARE

## 2021-07-27 VITALS
RESPIRATION RATE: 18 BRPM | WEIGHT: 177.69 LBS | HEIGHT: 68 IN | OXYGEN SATURATION: 98 % | DIASTOLIC BLOOD PRESSURE: 70 MMHG | SYSTOLIC BLOOD PRESSURE: 150 MMHG | BODY MASS INDEX: 26.93 KG/M2 | TEMPERATURE: 98 F | HEART RATE: 52 BPM

## 2021-07-27 DIAGNOSIS — I71.21 ASCENDING AORTIC ANEURYSM: Primary | ICD-10-CM

## 2021-07-27 PROCEDURE — 99205 PR OFFICE/OUTPT VISIT, NEW, LEVL V, 60-74 MIN: ICD-10-PCS | Mod: S$PBB,,, | Performed by: THORACIC SURGERY (CARDIOTHORACIC VASCULAR SURGERY)

## 2021-07-27 PROCEDURE — 99999 PR PBB SHADOW E&M-EST. PATIENT-LVL IV: ICD-10-PCS | Mod: PBBFAC,,, | Performed by: THORACIC SURGERY (CARDIOTHORACIC VASCULAR SURGERY)

## 2021-07-27 PROCEDURE — 99205 OFFICE O/P NEW HI 60 MIN: CPT | Mod: S$PBB,,, | Performed by: THORACIC SURGERY (CARDIOTHORACIC VASCULAR SURGERY)

## 2021-07-27 PROCEDURE — 99999 PR PBB SHADOW E&M-EST. PATIENT-LVL IV: CPT | Mod: PBBFAC,,, | Performed by: THORACIC SURGERY (CARDIOTHORACIC VASCULAR SURGERY)

## 2021-07-27 PROCEDURE — 99214 OFFICE O/P EST MOD 30 MIN: CPT | Mod: PBBFAC | Performed by: THORACIC SURGERY (CARDIOTHORACIC VASCULAR SURGERY)

## 2021-08-13 ENCOUNTER — LAB VISIT (OUTPATIENT)
Dept: PRIMARY CARE CLINIC | Facility: OTHER | Age: 76
End: 2021-08-13
Payer: MEDICARE

## 2021-08-13 DIAGNOSIS — R05.9 COUGH: ICD-10-CM

## 2021-08-13 PROCEDURE — U0003 INFECTIOUS AGENT DETECTION BY NUCLEIC ACID (DNA OR RNA); SEVERE ACUTE RESPIRATORY SYNDROME CORONAVIRUS 2 (SARS-COV-2) (CORONAVIRUS DISEASE [COVID-19]), AMPLIFIED PROBE TECHNIQUE, MAKING USE OF HIGH THROUGHPUT TECHNOLOGIES AS DESCRIBED BY CMS-2020-01-R: HCPCS | Performed by: NURSE PRACTITIONER

## 2021-08-14 LAB
SARS-COV-2 RNA RESP QL NAA+PROBE: NOT DETECTED
SARS-COV-2- CYCLE NUMBER: -1

## 2021-12-09 ENCOUNTER — OFFICE VISIT (OUTPATIENT)
Dept: URGENT CARE | Facility: CLINIC | Age: 76
End: 2021-12-09
Payer: MEDICARE

## 2021-12-09 VITALS
HEIGHT: 69 IN | HEART RATE: 61 BPM | SYSTOLIC BLOOD PRESSURE: 103 MMHG | TEMPERATURE: 97 F | OXYGEN SATURATION: 98 % | DIASTOLIC BLOOD PRESSURE: 63 MMHG | WEIGHT: 170 LBS | RESPIRATION RATE: 18 BRPM | BODY MASS INDEX: 25.18 KG/M2

## 2021-12-09 DIAGNOSIS — R05.9 COUGH: ICD-10-CM

## 2021-12-09 DIAGNOSIS — U07.1 COVID-19: Primary | ICD-10-CM

## 2021-12-09 LAB
CTP QC/QA: YES
SARS-COV-2 RDRP RESP QL NAA+PROBE: POSITIVE

## 2021-12-09 PROCEDURE — U0002: ICD-10-PCS | Mod: QW,CR,S$GLB, | Performed by: PHYSICIAN ASSISTANT

## 2021-12-09 PROCEDURE — 99213 OFFICE O/P EST LOW 20 MIN: CPT | Mod: CR,S$GLB,, | Performed by: PHYSICIAN ASSISTANT

## 2021-12-09 PROCEDURE — 99213 PR OFFICE/OUTPT VISIT, EST, LEVL III, 20-29 MIN: ICD-10-PCS | Mod: CR,S$GLB,, | Performed by: PHYSICIAN ASSISTANT

## 2021-12-09 PROCEDURE — U0002 COVID-19 LAB TEST NON-CDC: HCPCS | Mod: QW,CR,S$GLB, | Performed by: PHYSICIAN ASSISTANT

## 2022-02-22 ENCOUNTER — OFFICE VISIT (OUTPATIENT)
Dept: OPHTHALMOLOGY | Facility: CLINIC | Age: 77
End: 2022-02-22
Payer: MEDICARE

## 2022-02-22 DIAGNOSIS — H35.372 EPIRETINAL MEMBRANE, LEFT EYE: ICD-10-CM

## 2022-02-22 DIAGNOSIS — H33.022 RETINAL DETACHMENT OF LEFT EYE WITH MULTIPLE BREAKS: ICD-10-CM

## 2022-02-22 DIAGNOSIS — H35.3131 NONEXUDATIVE AGE-RELATED MACULAR DEGENERATION, BILATERAL, EARLY DRY STAGE: Primary | ICD-10-CM

## 2022-02-22 DIAGNOSIS — H43.811 POSTERIOR VITREOUS DETACHMENT OF RIGHT EYE: ICD-10-CM

## 2022-02-22 PROBLEM — H35.3132 NONEXUDATIVE AGE-RELATED MACULAR DEGENERATION, BILATERAL, INTERMEDIATE DRY STAGE: Status: ACTIVE | Noted: 2017-01-09

## 2022-02-22 PROCEDURE — 1160F PR REVIEW ALL MEDS BY PRESCRIBER/CLIN PHARMACIST DOCUMENTED: ICD-10-PCS | Mod: CPTII,S$GLB,, | Performed by: OPHTHALMOLOGY

## 2022-02-22 PROCEDURE — 1101F PR PT FALLS ASSESS DOC 0-1 FALLS W/OUT INJ PAST YR: ICD-10-PCS | Mod: CPTII,S$GLB,, | Performed by: OPHTHALMOLOGY

## 2022-02-22 PROCEDURE — 99999 PR PBB SHADOW E&M-EST. PATIENT-LVL III: ICD-10-PCS | Mod: PBBFAC,,, | Performed by: OPHTHALMOLOGY

## 2022-02-22 PROCEDURE — 1160F RVW MEDS BY RX/DR IN RCRD: CPT | Mod: CPTII,S$GLB,, | Performed by: OPHTHALMOLOGY

## 2022-02-22 PROCEDURE — 92201 PR OPHTHALMOSCOPY, EXT, W/RET DRAW/SCLERAL DEPR, I&R, UNI/BI: ICD-10-PCS | Mod: S$GLB,,, | Performed by: OPHTHALMOLOGY

## 2022-02-22 PROCEDURE — 99999 PR PBB SHADOW E&M-EST. PATIENT-LVL III: CPT | Mod: PBBFAC,,, | Performed by: OPHTHALMOLOGY

## 2022-02-22 PROCEDURE — 92014 PR EYE EXAM, EST PATIENT,COMPREHESV: ICD-10-PCS | Mod: S$GLB,,, | Performed by: OPHTHALMOLOGY

## 2022-02-22 PROCEDURE — 1159F MED LIST DOCD IN RCRD: CPT | Mod: CPTII,S$GLB,, | Performed by: OPHTHALMOLOGY

## 2022-02-22 PROCEDURE — 92014 COMPRE OPH EXAM EST PT 1/>: CPT | Mod: S$GLB,,, | Performed by: OPHTHALMOLOGY

## 2022-02-22 PROCEDURE — 92134 POSTERIOR SEGMENT OCT RETINA (OCULAR COHERENCE TOMOGRAPHY)-BOTH EYES: ICD-10-PCS | Mod: S$GLB,,, | Performed by: OPHTHALMOLOGY

## 2022-02-22 PROCEDURE — 1159F PR MEDICATION LIST DOCUMENTED IN MEDICAL RECORD: ICD-10-PCS | Mod: CPTII,S$GLB,, | Performed by: OPHTHALMOLOGY

## 2022-02-22 PROCEDURE — 1101F PT FALLS ASSESS-DOCD LE1/YR: CPT | Mod: CPTII,S$GLB,, | Performed by: OPHTHALMOLOGY

## 2022-02-22 PROCEDURE — 92134 CPTRZ OPH DX IMG PST SGM RTA: CPT | Mod: S$GLB,,, | Performed by: OPHTHALMOLOGY

## 2022-02-22 PROCEDURE — 92201 OPSCPY EXTND RTA DRAW UNI/BI: CPT | Mod: S$GLB,,, | Performed by: OPHTHALMOLOGY

## 2022-02-22 PROCEDURE — 3288F FALL RISK ASSESSMENT DOCD: CPT | Mod: CPTII,S$GLB,, | Performed by: OPHTHALMOLOGY

## 2022-02-22 PROCEDURE — 2023F DILAT RTA XM W/O RTNOPTHY: CPT | Mod: CPTII,S$GLB,, | Performed by: OPHTHALMOLOGY

## 2022-02-22 PROCEDURE — 1126F PR PAIN SEVERITY QUANTIFIED, NO PAIN PRESENT: ICD-10-PCS | Mod: CPTII,S$GLB,, | Performed by: OPHTHALMOLOGY

## 2022-02-22 PROCEDURE — 3288F PR FALLS RISK ASSESSMENT DOCUMENTED: ICD-10-PCS | Mod: CPTII,S$GLB,, | Performed by: OPHTHALMOLOGY

## 2022-02-22 PROCEDURE — 2023F PR DILATED RETINAL EXAM W/O EVID OF RETINOPATHY: ICD-10-PCS | Mod: CPTII,S$GLB,, | Performed by: OPHTHALMOLOGY

## 2022-02-22 PROCEDURE — 1126F AMNT PAIN NOTED NONE PRSNT: CPT | Mod: CPTII,S$GLB,, | Performed by: OPHTHALMOLOGY

## 2022-02-22 NOTE — PROGRESS NOTES
HPI     2 year mac annual OCT/DFE    Pt states his va is stable since last visit but needs refraction for new   gl. No new symptoms or concerns today.    No flashes  No floaters  No pain  No gtts    Last edited by Tatyana Day on 2/22/2022  8:00 AM. (History)            OCT  Drusen OU, ERM OS      A/P    1. PVD OD  - no tears/Breaks  - RD precautions    Central Vit opacities OD - No PPV now, if NI, can consider      2.  H/X RRD OS S/P PPV/SB/EL 1987  Corneal laceration & cataract removal & 2 implant 1 yr later.    OS  - stable monitor      3 Dry ARMD OU  Some increase in central drusen OD  AREDS/AG    4. ERM OS  - monitor        5. PCIOL OU        2 year OCT

## 2022-03-16 ENCOUNTER — OFFICE VISIT (OUTPATIENT)
Dept: FAMILY MEDICINE | Facility: CLINIC | Age: 77
End: 2022-03-16
Payer: MEDICARE

## 2022-03-16 VITALS
DIASTOLIC BLOOD PRESSURE: 70 MMHG | WEIGHT: 175 LBS | HEART RATE: 59 BPM | SYSTOLIC BLOOD PRESSURE: 132 MMHG | BODY MASS INDEX: 25.92 KG/M2 | HEIGHT: 69 IN | OXYGEN SATURATION: 96 %

## 2022-03-16 DIAGNOSIS — H35.372 EPIRETINAL MEMBRANE, LEFT EYE: ICD-10-CM

## 2022-03-16 DIAGNOSIS — H33.022 RETINAL DETACHMENT OF LEFT EYE WITH MULTIPLE BREAKS: ICD-10-CM

## 2022-03-16 DIAGNOSIS — I71.20 THORACIC AORTIC ANEURYSM WITHOUT RUPTURE: ICD-10-CM

## 2022-03-16 DIAGNOSIS — I71.21 ASCENDING AORTIC ANEURYSM: ICD-10-CM

## 2022-03-16 DIAGNOSIS — I25.10 CORONARY ARTERY DISEASE INVOLVING NATIVE CORONARY ARTERY OF NATIVE HEART WITHOUT ANGINA PECTORIS: ICD-10-CM

## 2022-03-16 DIAGNOSIS — E11.65 TYPE 2 DIABETES MELLITUS WITH HYPERGLYCEMIA, WITHOUT LONG-TERM CURRENT USE OF INSULIN: ICD-10-CM

## 2022-03-16 DIAGNOSIS — I70.213 ATHEROSCLEROSIS OF NATIVE ARTERY OF BOTH LOWER EXTREMITIES WITH INTERMITTENT CLAUDICATION: ICD-10-CM

## 2022-03-16 DIAGNOSIS — H35.3132 NONEXUDATIVE AGE-RELATED MACULAR DEGENERATION, BILATERAL, INTERMEDIATE DRY STAGE: ICD-10-CM

## 2022-03-16 DIAGNOSIS — I10 PRIMARY HYPERTENSION: ICD-10-CM

## 2022-03-16 DIAGNOSIS — M10.9 GOUT, ARTHRITIS: ICD-10-CM

## 2022-03-16 DIAGNOSIS — J84.10 CALCIFIED GRANULOMA OF LUNG: ICD-10-CM

## 2022-03-16 DIAGNOSIS — Z00.00 ENCOUNTER FOR PREVENTIVE HEALTH EXAMINATION: Primary | ICD-10-CM

## 2022-03-16 DIAGNOSIS — H43.811 POSTERIOR VITREOUS DETACHMENT OF RIGHT EYE: ICD-10-CM

## 2022-03-16 PROBLEM — J98.4 CALCIFIED GRANULOMA OF LUNG: Status: ACTIVE | Noted: 2022-03-16

## 2022-03-16 PROCEDURE — 1170F FXNL STATUS ASSESSED: CPT | Mod: CPTII,S$GLB,, | Performed by: NURSE PRACTITIONER

## 2022-03-16 PROCEDURE — G0439 PR MEDICARE ANNUAL WELLNESS SUBSEQUENT VISIT: ICD-10-PCS | Mod: S$GLB,,, | Performed by: NURSE PRACTITIONER

## 2022-03-16 PROCEDURE — 1126F PR PAIN SEVERITY QUANTIFIED, NO PAIN PRESENT: ICD-10-PCS | Mod: CPTII,S$GLB,, | Performed by: NURSE PRACTITIONER

## 2022-03-16 PROCEDURE — 1126F AMNT PAIN NOTED NONE PRSNT: CPT | Mod: CPTII,S$GLB,, | Performed by: NURSE PRACTITIONER

## 2022-03-16 PROCEDURE — 1101F PR PT FALLS ASSESS DOC 0-1 FALLS W/OUT INJ PAST YR: ICD-10-PCS | Mod: CPTII,S$GLB,, | Performed by: NURSE PRACTITIONER

## 2022-03-16 PROCEDURE — G0439 PPPS, SUBSEQ VISIT: HCPCS | Mod: S$GLB,,, | Performed by: NURSE PRACTITIONER

## 2022-03-16 PROCEDURE — 1170F PR FUNCTIONAL STATUS ASSESSED: ICD-10-PCS | Mod: CPTII,S$GLB,, | Performed by: NURSE PRACTITIONER

## 2022-03-16 PROCEDURE — 3075F PR MOST RECENT SYSTOLIC BLOOD PRESS GE 130-139MM HG: ICD-10-PCS | Mod: CPTII,S$GLB,, | Performed by: NURSE PRACTITIONER

## 2022-03-16 PROCEDURE — 3075F SYST BP GE 130 - 139MM HG: CPT | Mod: CPTII,S$GLB,, | Performed by: NURSE PRACTITIONER

## 2022-03-16 PROCEDURE — 3288F PR FALLS RISK ASSESSMENT DOCUMENTED: ICD-10-PCS | Mod: CPTII,S$GLB,, | Performed by: NURSE PRACTITIONER

## 2022-03-16 PROCEDURE — 1101F PT FALLS ASSESS-DOCD LE1/YR: CPT | Mod: CPTII,S$GLB,, | Performed by: NURSE PRACTITIONER

## 2022-03-16 PROCEDURE — 3078F DIAST BP <80 MM HG: CPT | Mod: CPTII,S$GLB,, | Performed by: NURSE PRACTITIONER

## 2022-03-16 PROCEDURE — 99999 PR PBB SHADOW E&M-EST. PATIENT-LVL III: CPT | Mod: PBBFAC,,, | Performed by: NURSE PRACTITIONER

## 2022-03-16 PROCEDURE — 99999 PR PBB SHADOW E&M-EST. PATIENT-LVL III: ICD-10-PCS | Mod: PBBFAC,,, | Performed by: NURSE PRACTITIONER

## 2022-03-16 PROCEDURE — 3078F PR MOST RECENT DIASTOLIC BLOOD PRESSURE < 80 MM HG: ICD-10-PCS | Mod: CPTII,S$GLB,, | Performed by: NURSE PRACTITIONER

## 2022-03-16 PROCEDURE — 3288F FALL RISK ASSESSMENT DOCD: CPT | Mod: CPTII,S$GLB,, | Performed by: NURSE PRACTITIONER

## 2022-03-16 NOTE — PROGRESS NOTES
"  Sanjeev Brink presented for a  Medicare AWV and comprehensive Health Risk Assessment today. The following components were reviewed and updated:    · Medical history  · Family History  · Social history  · Allergies and Current Medications  · Health Risk Assessment  · Health Maintenance  · Care Team         ** See Completed Assessments for Annual Wellness Visit within the encounter summary.**         The following assessments were completed:  · Living Situation  · CAGE  · Depression Screening  · Timed Get Up and Go  · Whisper Test  · Cognitive Function Screening  · Nutrition Screening  · ADL Screening  · PAQ Screening            Vitals:    03/16/22 0804   BP: 132/70   Pulse: (!) 59   SpO2: 96%   Weight: 79.4 kg (175 lb)   Height: 5' 9" (1.753 m)     Body mass index is 25.84 kg/m².  Physical Exam  Constitutional:       General: He is not in acute distress.     Appearance: Normal appearance. He is not ill-appearing.   HENT:      Head: Normocephalic.      Right Ear: Tympanic membrane and ear canal normal.      Left Ear: Tympanic membrane and ear canal normal.   Eyes:      Conjunctiva/sclera: Conjunctivae normal.   Cardiovascular:      Rate and Rhythm: Normal rate and regular rhythm.   Pulmonary:      Effort: Pulmonary effort is normal. No respiratory distress.      Breath sounds: Normal breath sounds.   Abdominal:      General: Abdomen is flat. Bowel sounds are normal. There is no distension.      Tenderness: There is no abdominal tenderness.   Musculoskeletal:         General: No swelling. Normal range of motion.      Cervical back: Normal range of motion.   Skin:     General: Skin is warm and dry.      Capillary Refill: Capillary refill takes less than 2 seconds.   Neurological:      Mental Status: He is alert and oriented to person, place, and time.   Psychiatric:         Mood and Affect: Mood normal.         Behavior: Behavior normal.               Diagnoses and health risks identified today and associated " recommendations/orders:    1. Encounter for preventive health examination    2. Primary hypertension  - Chronic, stable, continue current medication therapy  - Followed by PCP    3. Ascending aortic aneurysm  4. Thoracic aortic aneurysm without rupture  5. Coronary artery disease involving native coronary artery of native heart without angina pectoris  6. Atherosclerosis of native artery of both lower extremities with intermittent claudication  - Chronic, stable, continue current medication therapy  - Followed by cardiology    7. Calcified granuloma of lung  - Stable, followed by PCP    8. Type 2 diabetes mellitus with hyperglycemia, without long-term current use of insulin  - Chronic, stable, controlled with diet   - Followed by PCP    9. Posterior vitreous detachment of right eye  10. Nonexudative age-related macular degeneration, bilateral, intermediate dry stage  11. Epiretinal membrane, left eye  12. 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 Gout, arthritis  - Chronic, stable, continue current medication therapy  - Followed by PCP      Provided Sanjeev with a 5-10 year written screening schedule and personal prevention plan. Recommendations were developed using the USPSTF age appropriate recommendations. Education, counseling, and referrals were provided as needed. After Visit Summary printed and given to patient which includes a list of additional screenings\tests needed.    Follow up for PCP visit as scheduled and Annual Medicare Wellness in 1 year.    SEBASTIAN Alexis  I offered to discuss advanced care planning, including how to pick a person who would make decisions for you if you were unable to make them for yourself, called a health care power of , and what kind of decisions you might make such as use of life sustaining treatments such as ventilators and tube feeding when faced with a life limiting illness recorded on a living will that they will  need to know. (How you want to be cared for as you near the end of your natural life)     X Patient is interested in learning more about how to make advanced directives.  I provided them paperwork and offered to discuss this with them.

## 2022-03-16 NOTE — PATIENT INSTRUCTIONS
Counseling and Referral of Other Preventative  (Italic type indicates deductible and co-insurance are waived)    Patient Name: Sanjeev Brink  Today's Date: 3/16/2022    Health Maintenance       Date Due Completion Date    Hepatitis C Screening Never done ---    Diabetes Urine Screening Never done ---    Foot Exam Never done ---    Shingles Vaccine (2 of 3) 12/10/2013 10/15/2013    COVID-19 Vaccine (3 - Booster for Pfizer series) 07/09/2021 2/9/2021    Influenza Vaccine (1) Never done ---    Hemoglobin A1c 01/12/2022 7/12/2021    Lipid Panel 07/12/2022 7/12/2021    Eye Exam 02/22/2023 2/22/2022    Colonoscopy 06/06/2023 6/6/2018 (Done)    Override on 6/6/2018: Done    Pneumococcal Vaccines (Age 65+) (1 of 1 - PPSV23) 06/11/2024 6/11/2019    TETANUS VACCINE 09/16/2030 9/16/2020        No orders of the defined types were placed in this encounter.    The following information is provided to all patients.  This information is to help you find resources for any of the problems found today that may be affecting your health:                Living healthy guide: www.[facility].[place].[facility]      Understanding Diabetes: www.diabetes.org      Eating healthy: www.cdc.gov/healthyweight      CDC home safety checklist: www.cdc.gov/steadi/patient.html      Agency on Aging: www.goea.[place]      Alcoholics anonymous (AA): www.aa.org      Physical Activity: www.kamar.nih.gov/dl7ptyj      Tobacco use: www.quitwithusla.org

## 2022-04-12 ENCOUNTER — OFFICE VISIT (OUTPATIENT)
Dept: OPTOMETRY | Facility: CLINIC | Age: 77
End: 2022-04-12
Payer: MEDICARE

## 2022-04-12 DIAGNOSIS — H52.4 MYOPIA OF BOTH EYES WITH ASTIGMATISM AND PRESBYOPIA: Primary | ICD-10-CM

## 2022-04-12 DIAGNOSIS — H52.203 MYOPIA OF BOTH EYES WITH ASTIGMATISM AND PRESBYOPIA: Primary | ICD-10-CM

## 2022-04-12 DIAGNOSIS — H52.13 MYOPIA OF BOTH EYES WITH ASTIGMATISM AND PRESBYOPIA: Primary | ICD-10-CM

## 2022-04-12 PROCEDURE — 3288F PR FALLS RISK ASSESSMENT DOCUMENTED: ICD-10-PCS | Mod: CPTII,S$GLB,, | Performed by: OPTOMETRIST

## 2022-04-12 PROCEDURE — 1101F PT FALLS ASSESS-DOCD LE1/YR: CPT | Mod: CPTII,S$GLB,, | Performed by: OPTOMETRIST

## 2022-04-12 PROCEDURE — 1101F PR PT FALLS ASSESS DOC 0-1 FALLS W/OUT INJ PAST YR: ICD-10-PCS | Mod: CPTII,S$GLB,, | Performed by: OPTOMETRIST

## 2022-04-12 PROCEDURE — 92015 PR REFRACTION: ICD-10-PCS | Mod: S$GLB,,, | Performed by: OPTOMETRIST

## 2022-04-12 PROCEDURE — 1126F AMNT PAIN NOTED NONE PRSNT: CPT | Mod: CPTII,S$GLB,, | Performed by: OPTOMETRIST

## 2022-04-12 PROCEDURE — 99999 PR PBB SHADOW E&M-EST. PATIENT-LVL III: CPT | Mod: PBBFAC,,, | Performed by: OPTOMETRIST

## 2022-04-12 PROCEDURE — 1159F MED LIST DOCD IN RCRD: CPT | Mod: CPTII,S$GLB,, | Performed by: OPTOMETRIST

## 2022-04-12 PROCEDURE — 1159F PR MEDICATION LIST DOCUMENTED IN MEDICAL RECORD: ICD-10-PCS | Mod: CPTII,S$GLB,, | Performed by: OPTOMETRIST

## 2022-04-12 PROCEDURE — 99999 PR PBB SHADOW E&M-EST. PATIENT-LVL III: ICD-10-PCS | Mod: PBBFAC,,, | Performed by: OPTOMETRIST

## 2022-04-12 PROCEDURE — 92015 DETERMINE REFRACTIVE STATE: CPT | Mod: S$GLB,,, | Performed by: OPTOMETRIST

## 2022-04-12 PROCEDURE — 1126F PR PAIN SEVERITY QUANTIFIED, NO PAIN PRESENT: ICD-10-PCS | Mod: CPTII,S$GLB,, | Performed by: OPTOMETRIST

## 2022-04-12 PROCEDURE — 3288F FALL RISK ASSESSMENT DOCD: CPT | Mod: CPTII,S$GLB,, | Performed by: OPTOMETRIST

## 2022-04-21 NOTE — PROGRESS NOTES
HPI     Last eye exam was 2/22/22 with Dr. Moreau.  Patient states decrease in near > distance vision with current glasses.   Wanted to get an updated glasses rx today.    Last edited by Danisha Segovia MA on 4/12/2022  9:53 AM. (History)            Assessment /Plan     For exam results, see Encounter Report.    Myopia of both eyes with astigmatism and presbyopia          1.  Bifocal rx given.  RTC as scheduled with Dr. Moreau.                    (4) no impairment

## 2022-06-01 DIAGNOSIS — I71.20 ANEURYSM, AORTA, THORACIC: Primary | ICD-10-CM

## 2022-08-20 NOTE — MR AVS SNAPSHOT
[facility] - Ophthalmology  [address]  [facility] [address]  Phone: [phone]  Fax: [phone]                  Sanjeev Brink   2017 7:40 AM   Office Visit    Description:  Male : 1945   Provider:  BONG Moreau MD   Department:  [facility]           Reason for Visit     Eye Problem           Diagnoses this Visit        Comments    Epiretinal membrane, left eye    -  Primary     Nonexudative age-related macular degeneration, bilateral, early dry stage         Posterior vitreous detachment of right eye         Retinal detachment, left                To Do List           Goals (5 Years of Data)     None      Follow-Up and Disposition     Return in about 1 year (around 2018).      [facility]     [facility] On Call Nurse Care Line -  Assistance  Unless otherwise directed by your provider, please contact [facility], our nurse care line that is available for  assistance.     Registered nurses in the [facility] provide: appointment scheduling, clinical advisement, health education, and other advisory services.  Call: [phone] (toll free)               Medications           Message regarding Medications     Verify the changes and/or additions to your medication regime listed below are the same as discussed with your clinician today.  If any of these changes or additions are incorrect, please notify your healthcare provider.             Verify that the below list of medications is an accurate representation of the medications you are currently taking.  If none reported, the list may be blank. If incorrect, please contact your healthcare provider. Carry this list with you in case of emergency.           Current Medications     allopurinol (ZYLOPRIM) 300 MG tablet Take 1 tablet (300 mg total) by mouth once daily.    amlodipine-benazepril 10-20mg (LOTREL) 10-20 mg per capsule Take 1 capsule by mouth once daily.    aspirin 81 MG Chew  Take 81 mg by mouth once daily.    atorvastatin (LIPITOR) 20 MG tablet Take 1 tablet (20 mg total) by mouth once daily. For chlesterol    cholecalciferol, vitamin D3, (VITAMIN D3) 2,000 unit Tab Take 1 tablet by mouth once daily.    cinnamon bark 500 mg capsule Take 2,000 mg by mouth once daily.    meloxicam (MOBIC) 7.5 MG tablet     potassium citrate (UROCIT-K) 10 mEq (1,080 mg) TbSR Take 1 tablet (10 mEq total) by mouth 4 (four) times daily.    vitamin E 200 UNIT capsule Take 200 Units by mouth once daily.           Clinical Reference Information           Allergies as of 5/19/2017     No Known Allergies      Immunizations Administered on Date of Encounter - 5/19/2017     None      Orders Placed During Today's Visit      Normal Orders This Visit    Posterior Segment OCT Retina-Both eyes       Instructions    Done-[facility]       Language Assistance Services     ATTENTION: Language assistance services are available, free of charge. Please call [phone].      ATENCIÓN: Si habla harithaañol, tiene a coto disposición servicios gratuitos de asistencia lingüística. Llame al [phone].     KAT Ý: N?u b?n nói Ti?ng Vi?t, có các d?ch v? h? tr? ngôn ng? mi?n phí dành cho b?n. G?i s? [phone].         [facility] complies with applicable Federal civil rights laws and does not discriminate on the basis of race, color, national origin, age, disability, or sex.         4 = No assist / stand by assistance

## 2022-09-17 NOTE — PROGRESS NOTES
HPI     72 y/o male presents for YAG Evaluation.  Pt states last month while in   shower thought he was having a retinal detachment OD.  Went in to see Dr Moreau who dx him with PVD.  Now has a ton of floaters and a translucent   veil that comes over vision with eye movement.  Not always there but very   disconcerning. Also finds vision is blurred OD.        Last edited by Misty Lieberman on 3/24/2017  3:15 PM.         Assessment /Plan     For exam results, see Encounter Report.    Nonexudative age-related macular degeneration, bilateral, early dry stage    Posterior capsular opacification, bilateral      Visually significant posterior capsular opacity present.  Discussed risks, benefits, and alternatives to laser surgery.  YAG laser capsulotomy Procedure Note:   Informed consent obtained and correct eye(s) verified with patient.  1 drop of topical Proparacaine and Iopidine instilled, and eye(s) dilated with 1% Tropicamide 2.5% Phenylephrine.  YAG laser applied to posterior capsule in cruciate pattern OD  Patient tolerated procedure well. No complications. Follow up in 1 month/PRN.                   
63.5

## 2023-01-12 ENCOUNTER — LAB VISIT (OUTPATIENT)
Dept: LAB | Facility: HOSPITAL | Age: 78
End: 2023-01-12
Attending: INTERNAL MEDICINE
Payer: MEDICARE

## 2023-01-12 DIAGNOSIS — E11.9 DIABETES MELLITUS WITHOUT COMPLICATION: ICD-10-CM

## 2023-01-12 LAB
ESTIMATED AVG GLUCOSE: 154 MG/DL (ref 68–131)
HBA1C MFR BLD: 7 % (ref 4–5.6)

## 2023-01-12 PROCEDURE — 36415 COLL VENOUS BLD VENIPUNCTURE: CPT | Performed by: INTERNAL MEDICINE

## 2023-01-12 PROCEDURE — 83036 HEMOGLOBIN GLYCOSYLATED A1C: CPT | Performed by: INTERNAL MEDICINE

## 2023-04-17 ENCOUNTER — LAB VISIT (OUTPATIENT)
Dept: LAB | Facility: HOSPITAL | Age: 78
End: 2023-04-17
Payer: MEDICARE

## 2023-04-17 DIAGNOSIS — E11.9 DIABETES MELLITUS WITHOUT COMPLICATION: ICD-10-CM

## 2023-04-17 LAB
ESTIMATED AVG GLUCOSE: 148 MG/DL (ref 68–131)
HBA1C MFR BLD: 6.8 % (ref 4–5.6)

## 2023-04-17 PROCEDURE — 36415 COLL VENOUS BLD VENIPUNCTURE: CPT | Performed by: INTERNAL MEDICINE

## 2023-04-17 PROCEDURE — 83036 HEMOGLOBIN GLYCOSYLATED A1C: CPT | Performed by: INTERNAL MEDICINE

## 2023-07-03 DIAGNOSIS — I71.20 ANEURYSM, AORTA, THORACIC: Primary | ICD-10-CM

## 2023-11-15 ENCOUNTER — LAB VISIT (OUTPATIENT)
Dept: LAB | Facility: HOSPITAL | Age: 78
End: 2023-11-15
Attending: INTERNAL MEDICINE
Payer: MEDICARE

## 2023-11-15 DIAGNOSIS — E11.9 DIABETES MELLITUS WITHOUT COMPLICATION: ICD-10-CM

## 2023-11-15 LAB
ESTIMATED AVG GLUCOSE: 157 MG/DL (ref 68–131)
HBA1C MFR BLD: 7.1 % (ref 4–5.6)

## 2023-11-15 PROCEDURE — 36415 COLL VENOUS BLD VENIPUNCTURE: CPT | Performed by: INTERNAL MEDICINE

## 2023-11-15 PROCEDURE — 83036 HEMOGLOBIN GLYCOSYLATED A1C: CPT | Performed by: INTERNAL MEDICINE

## 2024-02-27 ENCOUNTER — OFFICE VISIT (OUTPATIENT)
Dept: OPHTHALMOLOGY | Facility: CLINIC | Age: 79
End: 2024-02-27
Payer: MEDICARE

## 2024-02-27 DIAGNOSIS — H33.022 RETINAL DETACHMENT OF LEFT EYE WITH MULTIPLE BREAKS: ICD-10-CM

## 2024-02-27 DIAGNOSIS — H35.3132 NONEXUDATIVE AGE-RELATED MACULAR DEGENERATION, BILATERAL, INTERMEDIATE DRY STAGE: Primary | ICD-10-CM

## 2024-02-27 DIAGNOSIS — H35.372 EPIRETINAL MEMBRANE, LEFT EYE: ICD-10-CM

## 2024-02-27 DIAGNOSIS — H43.811 POSTERIOR VITREOUS DETACHMENT OF RIGHT EYE: ICD-10-CM

## 2024-02-27 PROCEDURE — 92134 CPTRZ OPH DX IMG PST SGM RTA: CPT | Mod: S$GLB,,, | Performed by: OPHTHALMOLOGY

## 2024-02-27 PROCEDURE — 92201 OPSCPY EXTND RTA DRAW UNI/BI: CPT | Mod: S$GLB,,, | Performed by: OPHTHALMOLOGY

## 2024-02-27 PROCEDURE — 92014 COMPRE OPH EXAM EST PT 1/>: CPT | Mod: S$GLB,,, | Performed by: OPHTHALMOLOGY

## 2024-02-27 PROCEDURE — 99999 PR PBB SHADOW E&M-EST. PATIENT-LVL II: CPT | Mod: PBBFAC,,, | Performed by: OPHTHALMOLOGY

## 2024-02-27 NOTE — PROGRESS NOTES
HPI     Macular Degeneration     Additional comments: 2 yr check           Comments    VA stable ou, no pain ou and denies floaters or flashes ou.    No gtts          Last edited by Idalmis Escobar on 2/27/2024  8:07 AM.              OCT  Drusen OU, ERM OS      A/P    1. PVD OD  - no tears/Breaks  - RD precautions    Central Vit opacities OD - No PPV now, if NI, can consider      2.  H/X RRD OS S/P PPV/SB/EL 1987  Corneal laceration & cataract removal & 2 implant 1 yr later.    OS  - stable monitor      3 Dry ARMD OU  Some increase in central drusen OD  AREDS/AG    4. ERM OS  - monitor  Given extensive ocular trauma - would monitor and treat if progresses, but watch if stable        5. PCIOL OU        1 year OCT

## 2024-04-29 ENCOUNTER — OFFICE VISIT (OUTPATIENT)
Dept: OPTOMETRY | Facility: CLINIC | Age: 79
End: 2024-04-29
Payer: MEDICARE

## 2024-04-29 DIAGNOSIS — H52.4 MYOPIA WITH PRESBYOPIA OF BOTH EYES: ICD-10-CM

## 2024-04-29 DIAGNOSIS — Z01.00 EYE EXAM, ROUTINE: Primary | ICD-10-CM

## 2024-04-29 DIAGNOSIS — H52.13 MYOPIA WITH PRESBYOPIA OF BOTH EYES: ICD-10-CM

## 2024-04-29 PROCEDURE — 3288F FALL RISK ASSESSMENT DOCD: CPT | Mod: CPTII,S$GLB,, | Performed by: OPTOMETRIST

## 2024-04-29 PROCEDURE — 1159F MED LIST DOCD IN RCRD: CPT | Mod: CPTII,S$GLB,, | Performed by: OPTOMETRIST

## 2024-04-29 PROCEDURE — 92015 DETERMINE REFRACTIVE STATE: CPT | Mod: S$GLB,,, | Performed by: OPTOMETRIST

## 2024-04-29 PROCEDURE — 1126F AMNT PAIN NOTED NONE PRSNT: CPT | Mod: CPTII,S$GLB,, | Performed by: OPTOMETRIST

## 2024-04-29 PROCEDURE — 1160F RVW MEDS BY RX/DR IN RCRD: CPT | Mod: CPTII,S$GLB,, | Performed by: OPTOMETRIST

## 2024-04-29 PROCEDURE — 92012 INTRM OPH EXAM EST PATIENT: CPT | Mod: S$GLB,,, | Performed by: OPTOMETRIST

## 2024-04-29 PROCEDURE — 1101F PT FALLS ASSESS-DOCD LE1/YR: CPT | Mod: CPTII,S$GLB,, | Performed by: OPTOMETRIST

## 2024-04-29 PROCEDURE — 99999 PR PBB SHADOW E&M-EST. PATIENT-LVL III: CPT | Mod: PBBFAC,,, | Performed by: OPTOMETRIST

## 2024-04-29 NOTE — PROGRESS NOTES
HPI    STEVEN: 2/27/24 w/ Dr. Moreau  Chief complaint (CC): 78 y.o. male presents for annual eye exam.   Interested in updating glasses. States he has occasional blurred/dim   vision. Patient unsure of how old current specs are. Has recently started   taking AREDS supplements.  Glasses? +  Contacts? -  H/o eye surgery, injections or laser: PCIOL OU, s/p PPV/SB/EL 1987 OS  H/o eye injury: yes, corneal laceration OS 1987  Known eye conditions?   1) PVD OD   2) h/o RRD OS   --s/p PPV/SB/EL   3) Dry ARMD OU   4) ERM OS   5) PCIOL OU   Family h/o eye conditions? -  Eye gtts? -      (-) Flashes (+)  Floaters (-) Mucous   (-)  Tearing (-) Itching (-) Burning   (-) Headaches (-) Eye Pain/discomfort (-) Irritation   (-)  Redness (-) Double vision (+) Blurry vision    Diabetic? borderline  A1c? Lab Results       Component                Value               Date                       HGBA1C                   7.1 (H)             11/15/2023                 Last edited by Cher Chavez on 4/29/2024  9:42 AM.            Assessment /Plan     For exam results, see Encounter Report.        Eye exam, routine  Myopia with presbyopia of both eyes   - New Spectacle Rx given, discussed different options for glasses.   - RTC as scheduled with Dr. Moreau

## 2024-06-14 ENCOUNTER — TELEPHONE (OUTPATIENT)
Dept: OPHTHALMOLOGY | Facility: CLINIC | Age: 79
End: 2024-06-14
Payer: MEDICARE

## 2024-08-14 ENCOUNTER — OFFICE VISIT (OUTPATIENT)
Dept: OPTOMETRY | Facility: CLINIC | Age: 79
End: 2024-08-14
Payer: COMMERCIAL

## 2024-08-14 DIAGNOSIS — H52.4 MYOPIA WITH PRESBYOPIA OF BOTH EYES: Primary | ICD-10-CM

## 2024-08-14 DIAGNOSIS — H52.13 MYOPIA WITH PRESBYOPIA OF BOTH EYES: Primary | ICD-10-CM

## 2024-08-14 PROCEDURE — 99999 PR PBB SHADOW E&M-EST. PATIENT-LVL II: CPT | Mod: PBBFAC,,, | Performed by: OPTOMETRIST

## 2024-08-14 PROCEDURE — 99499 UNLISTED E&M SERVICE: CPT | Mod: S$GLB,,, | Performed by: OPTOMETRIST

## 2024-08-14 NOTE — PROGRESS NOTES
HPI    Pt presents with c/o slightly blurry vision and reduced zones on PAL -   feels he only has a small space on lens to see distance, intermediate and   reading.  Last edited by Shiraz Tran, OD on 8/14/2024  3:06 PM.            Assessment /Plan     For exam results, see Encounter Report.    Myopia with presbyopia of both eyes      Myopia with presbyopia of both eyes   - Updated spectacle Rx given today. Pt advised to discuss zones in PAL with staff at optical.    - RTC 1 year for annual exam.

## 2025-02-13 ENCOUNTER — LAB VISIT (OUTPATIENT)
Dept: LAB | Facility: HOSPITAL | Age: 80
End: 2025-02-13
Attending: INTERNAL MEDICINE
Payer: MEDICARE

## 2025-02-13 DIAGNOSIS — E11.9 DIABETES MELLITUS WITHOUT COMPLICATION: ICD-10-CM

## 2025-02-13 LAB
ESTIMATED AVG GLUCOSE: 177 MG/DL (ref 68–131)
HBA1C MFR BLD: 7.8 % (ref 4–5.6)

## 2025-02-13 PROCEDURE — 36415 COLL VENOUS BLD VENIPUNCTURE: CPT | Performed by: INTERNAL MEDICINE

## 2025-02-13 PROCEDURE — 83036 HEMOGLOBIN GLYCOSYLATED A1C: CPT | Performed by: INTERNAL MEDICINE

## 2025-08-15 PROCEDURE — 84153 ASSAY OF PSA TOTAL: CPT | Performed by: INTERNAL MEDICINE

## 2025-08-29 PROCEDURE — 82043 UR ALBUMIN QUANTITATIVE: CPT | Performed by: INTERNAL MEDICINE
